# Patient Record
Sex: FEMALE | Race: AMERICAN INDIAN OR ALASKA NATIVE
[De-identification: names, ages, dates, MRNs, and addresses within clinical notes are randomized per-mention and may not be internally consistent; named-entity substitution may affect disease eponyms.]

---

## 2019-02-16 ENCOUNTER — HOSPITAL ENCOUNTER (INPATIENT)
Dept: HOSPITAL 5 - TRG | Age: 29
LOS: 2 days | Discharge: HOME | End: 2019-02-18
Attending: OBSTETRICS & GYNECOLOGY | Admitting: OBSTETRICS & GYNECOLOGY
Payer: COMMERCIAL

## 2019-02-16 DIAGNOSIS — Z3A.38: ICD-10-CM

## 2019-02-16 LAB
BASOPHILS # (AUTO): 0.1 K/MM3 (ref 0–0.1)
BASOPHILS NFR BLD AUTO: 1.2 % (ref 0–1.8)
EOSINOPHIL # BLD AUTO: 0 K/MM3 (ref 0–0.4)
EOSINOPHIL NFR BLD AUTO: 0.4 % (ref 0–4.3)
HCT VFR BLD CALC: 38.7 % (ref 30.3–42.9)
HGB BLD-MCNC: 12.9 GM/DL (ref 10.1–14.3)
LYMPHOCYTES # BLD AUTO: 2.2 K/MM3 (ref 1.2–5.4)
LYMPHOCYTES NFR BLD AUTO: 24.7 % (ref 13.4–35)
MCHC RBC AUTO-ENTMCNC: 33 % (ref 30–34)
MCV RBC AUTO: 88 FL (ref 79–97)
MONOCYTES # (AUTO): 0.4 K/MM3 (ref 0–0.8)
MONOCYTES % (AUTO): 4.6 % (ref 0–7.3)
PLATELET # BLD: 307 K/MM3 (ref 140–440)
RBC # BLD AUTO: 4.37 M/MM3 (ref 3.65–5.03)

## 2019-02-16 PROCEDURE — 85025 COMPLETE CBC W/AUTO DIFF WBC: CPT

## 2019-02-16 PROCEDURE — 85018 HEMOGLOBIN: CPT

## 2019-02-16 PROCEDURE — 86850 RBC ANTIBODY SCREEN: CPT

## 2019-02-16 PROCEDURE — 36415 COLL VENOUS BLD VENIPUNCTURE: CPT

## 2019-02-16 PROCEDURE — 86901 BLOOD TYPING SEROLOGIC RH(D): CPT

## 2019-02-16 PROCEDURE — 86900 BLOOD TYPING SEROLOGIC ABO: CPT

## 2019-02-16 PROCEDURE — 85014 HEMATOCRIT: CPT

## 2019-02-16 PROCEDURE — 59025 FETAL NON-STRESS TEST: CPT

## 2019-02-16 RX ADMIN — IBUPROFEN SCH MG: 600 TABLET, FILM COATED ORAL at 17:00

## 2019-02-16 RX ADMIN — SODIUM CHLORIDE, SODIUM LACTATE, POTASSIUM CHLORIDE, AND CALCIUM CHLORIDE SCH MLS/HR: .6; .31; .03; .02 INJECTION, SOLUTION INTRAVENOUS at 09:16

## 2019-02-16 RX ADMIN — DOCUSATE SODIUM SCH MG: 100 CAPSULE, LIQUID FILLED ORAL at 21:21

## 2019-02-16 RX ADMIN — IBUPROFEN SCH MG: 600 TABLET, FILM COATED ORAL at 23:01

## 2019-02-16 RX ADMIN — SODIUM CHLORIDE, SODIUM LACTATE, POTASSIUM CHLORIDE, AND CALCIUM CHLORIDE SCH MLS/HR: .6; .31; .03; .02 INJECTION, SOLUTION INTRAVENOUS at 10:07

## 2019-02-16 NOTE — PROCEDURE NOTE
OB Delivery Note





- Delivery


Date of Delivery: 19


Surgeon: JOAN MERCADO


Estimated blood loss: 200cc





- Vaginal


Delivery presentation: vertex


Delivery position: OA


Intrapartum events: none


Delivery monitor: external FHT, external uterine


Route of delivery: 


Delivery placenta: spontaneous


Delivery cord: 3 umbilical vessels


Episiotomy: none


Delivery comments: 





viable female  delivered over intact perineum. Weight 3161 grams. Apgars 

8,9. No lacerations. Placenta delivered spontaneously and intact. Excellent 

henostasis. Patient tolerated procedure well.





- Infant


  ** A


Apgar at 1 minute: 8


Apgar at 5 minutes: 9


Infant Gender: Female (weight 3186 gram)

## 2019-02-16 NOTE — HISTORY AND PHYSICAL REPORT
History of Present Illness


Date of examination: 19


Date of admission: 


19 08:45





Chief complaint: 





I'm having contractions


History of present illness: 





pt is a 28 year old female who presents in active labor at 38.1 weeks. She is a 

.





Past History


Past Medical History: no pertinent history


Past Surgical History: no surgical history


GYN History: herpes


Family/Genetic History: none


Social history: single





- Obstetrical History


Expected Date of Delivery: 19


Actual Gestation: 38 Week(s) 2 Day(s) 


: 3


Number of Living Children: 2





Medications and Allergies


                                    Allergies











Allergy/AdvReac Type Severity Reaction Status Date / Time


 


No Known Allergies Allergy   Verified 19 05:01











                                Home Medications











 Medication  Instructions  Recorded  Confirmed  Last Taken  Type


 


Valacyclovir HCl [valACYclovir] 500 mg PO DAILY 05/24/15 02/16/19 1 Day Ago 

History





    ~05/23/15 











Active Meds: 


Active Medications





Lactated Ringer's (Lactated Ringers)  1,000 mls @ 125 mls/hr IV AS DIRECT DAPHNEY


   Last Admin: 19 10:07 Dose:  125 mls/hr


   Documented by: 











Review of Systems


All systems: negative


Eyes: deferred


Ears, nose, mouth and throat: deferred


Breasts: deferred


Genitourinary: contractions





- Vital Signs


Vital signs: 


                                   Vital Signs











Pulse BP


 


 112 H  136/74 


 


 19 08:35  19 08:35








                                        











Temp Pulse Resp BP Pulse Ox


 


 98.0 F   98 H  20   118/64    


 


 19 08:42  19 10:15  19 08:42  19 10:15   














- Physical Exam


Breasts: 


Cardiovascular: Regular rate, Normal S1, Normal S2


Lungs: Positive: Clear to auscultation


Abdomen: Positive: normal appearance, soft, normal bowel sounds.  Negative: 

distention, tenderness


Genitourinary (Female): Positive: normal external genitalia, normal perenium


Vulva: both: normal


Vagina: Positive: normal moisture.  Negative: discharge


Cervix: Negative: lesion, discharge


Uterus: Positive: normal size, normal contour


Adnexa: both: normal


Anus/Rectum: Positive: normal perianal skin, heme negative.  Negative: rectal 

mass, hemorrhoids


Extremities: 


Deep Tendon Reflex Grade: Normal +2





- Obstetrical


FHR: auscultation normal


Cervical Dilatation: 6


Cervical Effacement Percentage: 100


Fetal station: -1


Uterine Contraction Pattern: Regular


Uterine Tone Measurement Phase: Contraction


Uterine Contraction Intensity: Strong/Firm





Results


Result Diagrams: 


                                 19 08:49





All other labs normal.








Assessment and Plan





IUP at 38.2 weeks in active labor. Admit. GBS negative. Epidural when requested.

 Anticipate . family

## 2019-02-17 LAB
HCT VFR BLD CALC: 34.9 % (ref 30.3–42.9)
HGB BLD-MCNC: 11.6 GM/DL (ref 10.1–14.3)

## 2019-02-17 RX ADMIN — DOCUSATE SODIUM SCH MG: 100 CAPSULE, LIQUID FILLED ORAL at 10:04

## 2019-02-17 RX ADMIN — IBUPROFEN SCH MG: 600 TABLET, FILM COATED ORAL at 10:04

## 2019-02-17 RX ADMIN — IBUPROFEN SCH: 600 TABLET, FILM COATED ORAL at 05:06

## 2019-02-17 RX ADMIN — DOCUSATE SODIUM SCH MG: 100 CAPSULE, LIQUID FILLED ORAL at 21:55

## 2019-02-17 RX ADMIN — IBUPROFEN SCH MG: 600 TABLET, FILM COATED ORAL at 18:47

## 2019-02-17 RX ADMIN — Medication SCH EACH: at 10:04

## 2019-02-18 VITALS — DIASTOLIC BLOOD PRESSURE: 72 MMHG | SYSTOLIC BLOOD PRESSURE: 126 MMHG

## 2019-02-18 RX ADMIN — DOCUSATE SODIUM SCH MG: 100 CAPSULE, LIQUID FILLED ORAL at 10:43

## 2019-02-18 RX ADMIN — Medication SCH EACH: at 10:43

## 2019-02-18 RX ADMIN — IBUPROFEN SCH MG: 600 TABLET, FILM COATED ORAL at 05:13

## 2019-02-18 RX ADMIN — IBUPROFEN SCH: 600 TABLET, FILM COATED ORAL at 02:00

## 2019-02-18 NOTE — DISCHARGE SUMMARY
Providers





- Providers


Date of Admission: 


19 08:45





Date of discharge: 19


Attending physician: 


DEREK FARIAS





Primary care physician: 


PRIMARY CARE MD








Hospitalization


Reason for admission: active labor


Delivery: 


Episiotomy: none


Laceration: none


Incision: normal


Other postpartum procedures: none


Discharge diagnosis: IUP at term delivered


 baby: female


Hospital course: 





patient came in delivered a viable female. routine postpartum. discarged home to

f/u in 4 weeks 


Condition at discharge: Good


Disposition: DC-01 TO HOME OR SELFCARE





Plan





- Discharge Medications


Prescriptions: 


Ibuprofen [Motrin] 600 mg PO Q8H PRN #30 tablet


 PRN Reason: Pain


oxyCODONE /ACETAMINOPHEN [Percocet 5/325] 1 tab PO Q6HR PRN #30 tablet


 PRN Reason: Pain





- Provider Discharge Summary


Activity: routine, no sex for 6 weeks, no strenuous exercise


Diet: routine


Instructions: routine


Additional instructions: 


[]  Smoking cessation referral if applicable(refer to patient education folder 

for contact #)


[]  Refer to [facility] Booklet








Call your doctor immediately for:


* Fever > 100.5


* Heavy vaginal bleeding ( >1 pad per hour)


* Severe persistent headache


* Shortness of breath


* Reddened, hot, painful area to leg or breast


* Drainage or odor from incision.





* Keep incision clean and dry at all times and follow doctor's instructions 

regarding bathing/showering











- Follow up plan


Follow up: 


PRIMARY CARE,MD [Primary Care Provider] - 19

## 2019-02-18 NOTE — PROGRESS NOTE
Assessment and Plan





A/P 


PPD2 


doing well


routine PP 


discharged home to f/u in 4 weeks 





Subjective





- Subjective


Date of service: 19


Principal diagnosis: 


Patient reports: appetite normal, voiding normally, pain well controlled, 

flatus, ambulating normally


: doing well





Objective





- Vital Signs


Latest vital signs: 


                                   Vital Signs











  Temp Pulse Resp BP BP Pulse Ox


 


 19 08:23  98.2 F  103 H  20  127/82   97


 


 19 06:13    20   


 


 19 05:13    20   


 


 19 02:00    20   


 


 19 01:31  98.2 F  107 H  24   125/69  98


 


 19 19:47    20   


 


 19 18:47    18   


 


 19 18:20  98.5 F  115 H  20  120/73   99


 


 19 13:28  98.2 F  102 H  25 H  115/69   97


 


 19 10:04    18   


 


 19 08:58  97.6 F  97 H  20  118/77   98








                                Intake and Output











 19





 23:59 07:59 15:59


 


Intake Total 240  


 


Balance 240  


 


Intake:   


 


  Intake, Free Water 240  


 


Other:   


 


  # Voids   


 


    Void 1 2 














- Exam


Breasts: Present: normal


Cardiovascular: Present: Regular rate, Normal S1


Lungs: Present: Clear to auscultation, Normal air movement


Abdomen: Present: normal appearance, soft, normal bowel sounds.  Absent: diste

ntion, tenderness, guarding


Vulva: both: normal


Uterus: Present: normal, firm, fundal height below umbilicus


Extremities: Present: normal


Deep Tendon Reflex Grade: Normal +2